# Patient Record
Sex: FEMALE | Race: WHITE | URBAN - METROPOLITAN AREA
[De-identification: names, ages, dates, MRNs, and addresses within clinical notes are randomized per-mention and may not be internally consistent; named-entity substitution may affect disease eponyms.]

---

## 2021-04-01 ENCOUNTER — WEB ENCOUNTER (OUTPATIENT)
Dept: URBAN - METROPOLITAN AREA CLINIC 124 | Facility: CLINIC | Age: 26
End: 2021-04-01

## 2021-04-03 PROBLEM — 10743008: Status: ACTIVE | Noted: 2021-04-03

## 2021-04-05 ENCOUNTER — WEB ENCOUNTER (OUTPATIENT)
Dept: URBAN - METROPOLITAN AREA CLINIC 124 | Facility: CLINIC | Age: 26
End: 2021-04-05

## 2021-04-05 ENCOUNTER — OFFICE VISIT (OUTPATIENT)
Dept: URBAN - METROPOLITAN AREA CLINIC 124 | Facility: CLINIC | Age: 26
End: 2021-04-05
Payer: COMMERCIAL

## 2021-04-05 VITALS
BODY MASS INDEX: 22.2 KG/M2 | HEIGHT: 64 IN | SYSTOLIC BLOOD PRESSURE: 110 MMHG | TEMPERATURE: 97.2 F | WEIGHT: 130 LBS | DIASTOLIC BLOOD PRESSURE: 78 MMHG

## 2021-04-05 DIAGNOSIS — K58.1 IRRITABLE BOWEL SYNDROME WITH CONSTIPATION: ICD-10-CM

## 2021-04-05 PROCEDURE — 99204 OFFICE O/P NEW MOD 45 MIN: CPT | Performed by: INTERNAL MEDICINE

## 2021-04-05 NOTE — HPI-TODAY'S VISIT:
24 yo female referred by Dr Milan ([place])  for a GI consultation of IBS-C and a copy of this note will be sent to the referring physician. 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 Pt was rec to see me by Dr Salgado in our group. Pt was having IBS symptoms in high school- intestinal spasms and had to go to [facility] and she was given pain meds, Pt did see a GI at home and was dx with IBS-C- she has taken antispasmotic. Pt generaly moves her bowels twice a day but when she has a flare-up and feels clogged up and usuallyu goes next day high volume stool- not bloody and she is fine rest of day. Pt has been very constipated this week- had one BM in the last week. Sunday had vietnames food but other that no changes she can tnink of. Pt has not recalled skipping BM s for a week before. Pt has an exam this week but generally in the past stress had not brought on constipation. In the past, gas producing foods brought it on. Pt has not tried miralax. Her dad mentioned this as he has IBS and is a physician but she admits she has not tried it. Read about stool luna and wondered about those tests. Has not had celiac or thyroid tests.

## 2021-05-10 ENCOUNTER — WEB ENCOUNTER (OUTPATIENT)
Dept: URBAN - METROPOLITAN AREA CLINIC 92 | Facility: CLINIC | Age: 26
End: 2021-05-10

## 2021-05-10 ENCOUNTER — OFFICE VISIT (OUTPATIENT)
Dept: URBAN - METROPOLITAN AREA TELEHEALTH 2 | Facility: TELEHEALTH | Age: 26
End: 2021-05-10
Payer: COMMERCIAL

## 2021-05-10 ENCOUNTER — DASHBOARD ENCOUNTERS (OUTPATIENT)
Age: 26
End: 2021-05-10

## 2021-05-10 DIAGNOSIS — R14.0 BLOATING: ICD-10-CM

## 2021-05-10 DIAGNOSIS — K58.1 IRRITABLE BOWEL SYNDROME WITH CONSTIPATION: ICD-10-CM

## 2021-05-10 PROBLEM — 440630006: Status: ACTIVE | Noted: 2021-04-05

## 2021-05-10 PROCEDURE — 99214 OFFICE O/P EST MOD 30 MIN: CPT | Performed by: INTERNAL MEDICINE

## 2021-05-10 RX ORDER — LUBIPROSTONE 8 UG/1
1 CAPSULE WITH FOOD AND WATER CAPSULE, GELATIN COATED ORAL TWICE A DAY
Qty: 60 | Refills: 1 | OUTPATIENT
Start: 2021-05-10 | End: 2021-07-09

## 2021-05-10 NOTE — HPI-TODAY'S VISIT:
25yoF seen in April for chronic constipation and abd pain thought to be IBS-C.  She has had constipation, abd discomfort and bloating for years, though sx worse over the last year. Sx worse with po intake regardless of what she eats. She is vegan and can not identify food triggers. Bloating/discomfort is the most aggravating sx to her--feels uncomfortable and impacts her day to day. This is most notable at night after dinner and improved with hyoscamine.  She has mild constipation and she used miralax for a week since her last OV and bowels normalized. Stopped miralax and having BM 2/day, with complete evacuation but bloating and discomfort has persisted. 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 She plans to have labs done in [place] when she is home--will check TSH and thyroid and inflammatory markers.  No N/V, weight loss, GIB.  Father with IBS

## 2021-10-18 ENCOUNTER — WEB ENCOUNTER (OUTPATIENT)
Dept: URBAN - METROPOLITAN AREA CLINIC 92 | Facility: CLINIC | Age: 26
End: 2021-10-18